# Patient Record
Sex: FEMALE | Race: WHITE | NOT HISPANIC OR LATINO | Employment: UNEMPLOYED | URBAN - METROPOLITAN AREA
[De-identification: names, ages, dates, MRNs, and addresses within clinical notes are randomized per-mention and may not be internally consistent; named-entity substitution may affect disease eponyms.]

---

## 2022-12-13 ENCOUNTER — TELEPHONE (OUTPATIENT)
Dept: NEUROLOGY | Facility: CLINIC | Age: 47
End: 2022-12-13
Payer: COMMERCIAL

## 2022-12-14 NOTE — TELEPHONE ENCOUNTER
----- Message from Rosalva Stover sent at 12/13/2022  3:22 PM CST -----  Type:  Patient Returning Call    Who Called:pt  Who Left Message for Patient:pt  Does the patient know what this is regarding?:pt would like to establish care and was suggested to call this provider to schedule  Would the patient rather a call back or a response via [facility]? Call  Best Call Back Number:[phone]   Additional Information: pt doesn't have a referral, not sure if she needs one

## 2022-12-16 NOTE — TELEPHONE ENCOUNTER
Patient of . Spoke to her and informed her that we would need  a referral  sent over to start the scheduling process. Informed her it would be her responsibility to obtain her records and disc of MRI images. Verbalized understanding. Patent is being placed on Dr. Vaughan's spreadsheet for scheduling by [employer].

## 2023-03-13 ENCOUNTER — OFFICE VISIT (OUTPATIENT)
Dept: NEUROLOGY | Facility: CLINIC | Age: 48
End: 2023-03-13
Payer: COMMERCIAL

## 2023-03-13 VITALS
WEIGHT: 160 LBS | SYSTOLIC BLOOD PRESSURE: 133 MMHG | BODY MASS INDEX: 29.44 KG/M2 | HEART RATE: 122 BPM | HEIGHT: 62 IN | DIASTOLIC BLOOD PRESSURE: 80 MMHG

## 2023-03-13 DIAGNOSIS — G35 MULTIPLE SCLEROSIS: Primary | ICD-10-CM

## 2023-03-13 DIAGNOSIS — F41.9 ANXIETY DISORDER, UNSPECIFIED TYPE: ICD-10-CM

## 2023-03-13 DIAGNOSIS — R26.89 IMPAIRED GAIT AND MOBILITY: ICD-10-CM

## 2023-03-13 DIAGNOSIS — F90.8 ATTENTION DEFICIT HYPERACTIVITY DISORDER (ADHD), OTHER TYPE: ICD-10-CM

## 2023-03-13 PROBLEM — J06.9 RECURRENT UPPER RESPIRATORY INFECTION (URI): Status: ACTIVE | Noted: 2017-11-27

## 2023-03-13 PROBLEM — I10 ESSENTIAL HYPERTENSION: Status: ACTIVE | Noted: 2019-02-25

## 2023-03-13 PROBLEM — F98.8 ADD (ATTENTION DEFICIT DISORDER): Status: ACTIVE | Noted: 2023-03-13

## 2023-03-13 PROBLEM — M79.10 MYALGIA: Status: ACTIVE | Noted: 2017-11-27

## 2023-03-13 PROBLEM — R20.2 LEFT HAND PARESTHESIA: Status: ACTIVE | Noted: 2018-12-11

## 2023-03-13 PROBLEM — M15.9 PRIMARY OSTEOARTHRITIS INVOLVING MULTIPLE JOINTS: Status: ACTIVE | Noted: 2018-03-05

## 2023-03-13 PROCEDURE — 3079F PR MOST RECENT DIASTOLIC BLOOD PRESSURE 80-89 MM HG: ICD-10-PCS | Mod: CPTII,S$GLB,, | Performed by: STUDENT IN AN ORGANIZED HEALTH CARE EDUCATION/TRAINING PROGRAM

## 2023-03-13 PROCEDURE — 99999 PR PBB SHADOW E&M-EST. PATIENT-LVL III: ICD-10-PCS | Mod: PBBFAC,,, | Performed by: STUDENT IN AN ORGANIZED HEALTH CARE EDUCATION/TRAINING PROGRAM

## 2023-03-13 PROCEDURE — 1159F PR MEDICATION LIST DOCUMENTED IN MEDICAL RECORD: ICD-10-PCS | Mod: CPTII,S$GLB,, | Performed by: STUDENT IN AN ORGANIZED HEALTH CARE EDUCATION/TRAINING PROGRAM

## 2023-03-13 PROCEDURE — 3079F DIAST BP 80-89 MM HG: CPT | Mod: CPTII,S$GLB,, | Performed by: STUDENT IN AN ORGANIZED HEALTH CARE EDUCATION/TRAINING PROGRAM

## 2023-03-13 PROCEDURE — 3008F BODY MASS INDEX DOCD: CPT | Mod: CPTII,S$GLB,, | Performed by: STUDENT IN AN ORGANIZED HEALTH CARE EDUCATION/TRAINING PROGRAM

## 2023-03-13 PROCEDURE — 99417 PROLNG OP E/M EACH 15 MIN: CPT | Mod: S$GLB,,, | Performed by: STUDENT IN AN ORGANIZED HEALTH CARE EDUCATION/TRAINING PROGRAM

## 2023-03-13 PROCEDURE — 99205 OFFICE O/P NEW HI 60 MIN: CPT | Mod: S$GLB,,, | Performed by: STUDENT IN AN ORGANIZED HEALTH CARE EDUCATION/TRAINING PROGRAM

## 2023-03-13 PROCEDURE — 3008F PR BODY MASS INDEX (BMI) DOCUMENTED: ICD-10-PCS | Mod: CPTII,S$GLB,, | Performed by: STUDENT IN AN ORGANIZED HEALTH CARE EDUCATION/TRAINING PROGRAM

## 2023-03-13 PROCEDURE — 99999 PR PBB SHADOW E&M-EST. PATIENT-LVL III: CPT | Mod: PBBFAC,,, | Performed by: STUDENT IN AN ORGANIZED HEALTH CARE EDUCATION/TRAINING PROGRAM

## 2023-03-13 PROCEDURE — 3075F PR MOST RECENT SYSTOLIC BLOOD PRESS GE 130-139MM HG: ICD-10-PCS | Mod: CPTII,S$GLB,, | Performed by: STUDENT IN AN ORGANIZED HEALTH CARE EDUCATION/TRAINING PROGRAM

## 2023-03-13 PROCEDURE — 99417 PR PROLONGED SVC, OUTPT, W/WO DIRECT PT CONTACT,  EA ADDTL 15 MIN: ICD-10-PCS | Mod: S$GLB,,, | Performed by: STUDENT IN AN ORGANIZED HEALTH CARE EDUCATION/TRAINING PROGRAM

## 2023-03-13 PROCEDURE — 99205 PR OFFICE/OUTPT VISIT, NEW, LEVL V, 60-74 MIN: ICD-10-PCS | Mod: S$GLB,,, | Performed by: STUDENT IN AN ORGANIZED HEALTH CARE EDUCATION/TRAINING PROGRAM

## 2023-03-13 PROCEDURE — 1159F MED LIST DOCD IN RCRD: CPT | Mod: CPTII,S$GLB,, | Performed by: STUDENT IN AN ORGANIZED HEALTH CARE EDUCATION/TRAINING PROGRAM

## 2023-03-13 PROCEDURE — 3075F SYST BP GE 130 - 139MM HG: CPT | Mod: CPTII,S$GLB,, | Performed by: STUDENT IN AN ORGANIZED HEALTH CARE EDUCATION/TRAINING PROGRAM

## 2023-03-13 RX ORDER — LEVOTHYROXINE SODIUM 50 UG/1
50 TABLET ORAL EVERY MORNING
COMMUNITY
Start: 2023-02-06

## 2023-03-13 RX ORDER — HYDROXYZINE PAMOATE 25 MG/1
25 CAPSULE ORAL
COMMUNITY
Start: 2023-01-10

## 2023-03-13 RX ORDER — LISDEXAMFETAMINE DIMESYLATE 70 MG/1
70 CAPSULE ORAL EVERY MORNING
COMMUNITY
Start: 2023-02-06

## 2023-03-13 RX ORDER — ALPRAZOLAM 0.25 MG/1
0.25 TABLET ORAL 2 TIMES DAILY PRN
COMMUNITY
Start: 2023-01-25

## 2023-03-13 RX ORDER — ELECTROLYTES/DEXTROSE
SOLUTION, ORAL ORAL
COMMUNITY

## 2023-03-13 RX ORDER — GABAPENTIN 300 MG/1
300 CAPSULE ORAL 2 TIMES DAILY
COMMUNITY
Start: 2023-02-13

## 2023-03-13 RX ORDER — SERTRALINE HYDROCHLORIDE 100 MG/1
150 TABLET, FILM COATED ORAL
COMMUNITY
Start: 2023-02-13

## 2023-03-13 NOTE — PROGRESS NOTES
Patient ID: [id number]  Referring Physician: Desiree Vaughan MD    Chief Complaint/Reason for Consult: establishing care for MS     Subjective:     LARA Alexander is a 47 y.o. RH female with hypothyroidism, RA, MS, and cervical spondylosis s/p ACDF (C4-C5, 10/2020). she is presenting today as a new patient to establish care for MS. she is accompanied by her daughter.     Symptom History:  Multiple neurological symptoms over the past 20 years including difficulty with gait and mobility, visual disturbances, spasticity, bladder dysfunction, cognitive deficits and fatigue.  August 2019 -  sudden-onset pain and weakness in LUE after physical activity. She was worked up for cervical disc herniation. MRI of cervical cord was unremarkable.  December 2020 -  MRI showed demyelinating lesions  January 2021 -  MRI thoracic spine unremarkable  LP showed >5 OCBs  which confirmed her diagnosis and she was started on Tecfidera     She is receiving treatment for RA with Dr. Horowitz  Reports 3 -months of loss of taste and smell    MS ROS:   Fatigue: Yes - occasionally wakes up refreshed and an hour later she is tired   Sleep Disturbance:   Bladder Dysfunction: Yes -    Bowel Dysfunction:   Spasticity:   Lhermitte's Sign:   Visual Symptoms: Yes - Monocular double vision in the left eye   Cognitive: Yes - Mixing up names, WFD, has to write down everything, hasn't driven in 1.5 due to slow processing  Mood Disorder: Yes - anxiety >depression   Gait Disturbance: Yes -    Falls: Yes -   Hand Dysfunction: Yes - fine motor tasks are difficult, clumsy and drops objects  Pain:   Tremors: Yes - just recently started   Dysphagia: Yes - difficulty swallowing even pills   Dysarthria: No   Heat sensitivity: Yes - very fatigued   Exercise:   Diet:      Past Medical History:  No past medical history on file.  Multiple sclerosis   Hypothyroidism  RA  Cervical spondylosis     Allergies:  Review of patient's allergies indicates:  No Known  Allergies    Pertinent Family History:  Daughters have RA.   She is adopted so doesn't know her family history    Pertinent Social History:  Vapes 30% nicotine, (quit smoking after the spine surgery, 20 years of 2-3c daily). rare alcohol, medical marijuana (tincture), no illicit substance use.   Living arrangements - the patient lives with their spouse.   with 2 children. 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     Medications:  Current Outpatient Medications   Medication Instructions    ALPRAZolam (XANAX) 0.25 mg, Oral, 2 times daily PRN    ASHWAGANDHA ROOT EXTRACT ORAL Oral    gabapentin (NEURONTIN) 300 mg, Oral, 2 times daily    hydrOXYzine pamoate (VISTARIL) 25 mg, Oral    magnesium chloride 64 mg magnesium Tab Oral    sertraline (ZOLOFT) 150 mg, Oral    SYNTHROID 50 mcg, Oral, Every morning    VYVANSE 70 mg, Oral, Every morning      Disease Modifying Therapy:   None    Vaccination status:  -    Objective:     Vitals:    03/13/23 1431   BP: 133/80   Pulse: (!) 122      General:  Well-appearing, well-nourished, NAD, cooperative    Neurologic Exam:   Awake, alert and oriented x3  Speech spontaneous and fluent, intact comprehension.  Some apraxia    CN II - CN XII:  PERRLA. EOM intact. No Nystagmus. No ophthalmoplegia.   Visual fields are full to confrontation.   Facial sensation is normal to light touch.   Facial expression is full and symmetric.   Hearing is intact bilaterally.   Palate elevates symmetrically.   SCM and Trapezius full strength bilaterally.   Tongue is midline.     Motor:  Normal bulk and tone in all four limbs.   There are no atrophy or fasciculations. No tremor.     Shoulder  Abd Shoulder Add Elbow   Flex Elbow  Ext Wrist   Flex Wrist  Ext Finger  Flex Finger  Ext Finger  Abd Finger   Add IO Opposition   Right 5 5 5 5 5 5 5 5 5 5 5 5   Left 5 5 4+ 5 5 5 5 5 5 5 5 5      Hip  Flex Hip  Ext Thigh   Abd Thigh  Add Knee  Flex Knee  Ext  Plantar  Flex Dorsiflex   Right 5 5   5 5 5 5   Left 5 5   5 5 5 5     Sensory:  Light touch: intact throughout.  Temperature: decreased in feet  Vibration: mild decrease in most distal joints x4 (BLE>BUE)  Proprioception: intact figure writing. position intact in BUE and BLE  Romberg is impaired even with open eyes.    DTRs:   Biceps Brachioradialis Triceps Dion Patellar Ankle Plantar   Right 2+ 2+  - 2+ 2+    Left 2+ 2+  - 2+ 2+      Coordination:  Finger to nose is normal bilaterally.  Notably impaired fine finger movements and rapid alternating movements (L>R)    Gait:  Can not do heel walking or tandem      Pertinent lab results  No recent relevant labs available to review     CSF analysis (01/12/2021):  WBC 1, RBC 3  Glucose 66   Protein 33  IgG index 0.58  IgG SR -0.8  OCB >5  Myelin basic protein <2  Lyme disease PCR -  BECKY virus PCR -  CMV DNA -  HSV 1 and 2 PCR -  VZV PCR -  WNV  PCR -  VDRL NR  Cytology negative for malignant cells    Pertinent imaging results    *Images personally reviewed and interpreted:    1/23/23  MRI Brain w/wo contrast:  Medium-sized ovoid shaped T2/FLAIR hyperintense signal within the left corona radiata.      8/11/2020  MRI Brain w/wo contrast:  Only minimal changes within the white matter        MRI Cervical Spine w/wo contrast:  None available to review, repotedly no lesions    01/25/2021  MRI Thoracic Spine w/wo contrast:  Per radiology report: ACDF hardware from C4 to C7. Signal intensity normal throughout the cord with no abnormal enhancement    Other pertinent studies    02/22/2021   Ophthalmology evaluation (Dr. English):   Examination is entirely within normal limits without any evidence of optic neuritis   Visual acuity without correction: OD 20/50,  OS 20/60   Pinholes:  OD 20/25,  OS 20/30   Pressure 15 OU    Assessment & Plan:   Nikole Alexander is a 47 y.o. right-handed female with RA, and probable rheumatoid arthris who presents with a diagnosis of MS for further  management. Clinical history is consistent with several years of multiple neurological symptoms without a true relapse. The episode of LUE pain and weakness was perhaps unrelated since it does not correspond to the brain lesion. Single medium-sized ovoid lesion within the subcortical white matter could be secondary to a demyelinating disorder however would not fulfill the Gore criteria. Spinal cord MRIs did not reveal any demyelination either. Presence of OCB in spinal fluid is very sensitive for MS and taken together with the clinical history would be most consistent with primary progressive MS, however, she would need to have >2 lesions with >1 brain MS-specific location to meet the criteria yet. Overall, the extent of cognitive and gait issues are out of proportion to the lesion burden. Therefore we will consider starting treatment. There might also be some overlapping features from depression and anxiety therefore I will refer her to psychiatry for evaluation and management. Reading material for Ocrevus (as the only FDA-approved treatment for PPMS) was provided. Potential side effects and adverse reactions were discussed. I also directed her to national MS society website for patient education and patient forms. She will let me know once she has a decision.     1. Attention deficit hyperactivity disorder (ADHD), other type    2. Anxiety disorder, unspecified type      Plan was discussed in detail with the patient, who is in agreement.  Patient is aware that she can contact with any questions, concerns, or new symptoms if needed before follow up appointment.    Time spent on this encounter: 100 minutes. This includes face to face time (obtaining history, documenting clinical information in the EMR, physical exam, discussing the plan with patient) and non-face to face time (such as preparing to see the patient (ie. Chart review, reviewing and interpreting previous labs and imaging), further EMR documentation,  ordering tests, independently interpreting results and communicating results to the patient/family/caregiver, or care coordinator). Additional time was spent on reviewing outside medical records that were faxed over as well as reviewing MRI discs that were brought in to the appointment.       Naima Denise MD    [facility]  03/13/2023